# Patient Record
Sex: FEMALE | Race: WHITE
[De-identification: names, ages, dates, MRNs, and addresses within clinical notes are randomized per-mention and may not be internally consistent; named-entity substitution may affect disease eponyms.]

---

## 2019-04-03 ENCOUNTER — HOSPITAL ENCOUNTER (OUTPATIENT)
Dept: HOSPITAL 97 - OR | Age: 59
Discharge: HOME | End: 2019-04-03
Attending: SPECIALIST
Payer: COMMERCIAL

## 2019-04-03 DIAGNOSIS — N65.1: Primary | ICD-10-CM

## 2019-04-03 PROCEDURE — 88305 TISSUE EXAM BY PATHOLOGIST: CPT

## 2019-04-03 PROCEDURE — 0HPU0JZ REMOVAL OF SYNTHETIC SUBSTITUTE FROM LEFT BREAST, OPEN APPROACH: ICD-10-PCS

## 2019-04-03 PROCEDURE — 0HSV0ZZ REPOSITION BILATERAL BREAST, OPEN APPROACH: ICD-10-PCS

## 2019-04-03 PROCEDURE — 0HPT0JZ REMOVAL OF SYNTHETIC SUBSTITUTE FROM RIGHT BREAST, OPEN APPROACH: ICD-10-PCS

## 2019-04-03 RX ADMIN — HYDROMORPHONE HYDROCHLORIDE ONE MG: 1 INJECTION, SOLUTION INTRAMUSCULAR; INTRAVENOUS; SUBCUTANEOUS at 15:01

## 2019-04-03 RX ADMIN — HYDROMORPHONE HYDROCHLORIDE ONE MG: 1 INJECTION, SOLUTION INTRAMUSCULAR; INTRAVENOUS; SUBCUTANEOUS at 15:17

## 2019-04-03 NOTE — XMS REPORT
Continuity of Care Document

 Created on:2019



Patient:ALYSSA MEJIA

Sex:Female

:1960

External Reference #:[id number]





Demographics







 Address  [address] [place] [address]

 

 Phone  [phone]

 

 Phone  [phone]

 

 Preferred Language  Unknown

 

 Marital Status  Unknown

 

 Jewish Affiliation  Unknown

 

 Race  Unknown

 

 Ethnic Group  Unknown









Author







 Organization  [facility]









Problems







 Problem  Status  Onset  Classification  Date  Comments  Source



     Date    Reported    



             



             



             

 

 ROUTINE GYN  Active    Diagnosis  10/08/2013    [facility]



 EXAMINATION            [facility]



             



             

 

 Encounter for  Active    Diagnosis  2019    [facility]



 gynecological            [facility]



 examination            



 (routine) without            



 abnormal findings            



             



             

 

 Lump of breast  Active    Diagnosis  2019    [facility]



             [facility]



             



             

 

 Encounter for  Active    Diagnosis  2019    [facility]



 screening            [facility]



 mammogram for            



 breast cancer            



             



             







Medications







 Medication  Details  Route  Status  Patient  Ordering  Order  Source



         Instructions  Provider  Date  



               



               



               

 

 multivitamin  1 cap(s)  orally  Active  Multiple  [facility]    [facility]



         Vitamins orally      [facility]



         once a day      



               



               

 

 multivitamin  1 cap(s)  orally  Active  Multiple  [facility]    [facility]



         Vitamins orally      [facility]



         once a day      



               



               

 

 Vitamin B12  not  NA  Active    [facility]    [facility]



   defined            [facility]



               



               

 

 Vitamin C  not  NA  Active    [facility]    [facility]



   defined            [facility]



               



               

 

 Vitamin D  not  NA  Active    F[facility]iel    [facility]



   defined            [facility]



               



               







Allergies, Adverse Reactions, Alerts







 Substance  Category  Reaction  Severity  Reaction  Status  Date  Comments  
Source



         type    Reported    



                 



                 



                 

 

 N.K.D.A.  Adverse  Info Not    Adverse  Active      [facility]



   Reaction  Available    Reaction    9    [facility]



                 



                 



                 







Immunizations







 Immunization  Date Given  Site  Status  Last Updated  Comments  Source



             



             







Results







 Order  Results  Value  Reference  Date  Interpretation  Comments  Source



 Name      Range        



               



               







Vital Signs







 Vital Sign  Value  Date  Comments  Source



         

 

 Weight  114  2019    [facility]



         



         

 

 Height  63  2019    [facility]



         



         

 

 Diastolic (mm Hg)  80  2019    [facility]



         



         

 

 Systolic (mm Hg)  118  2019    [facility]



         



         

 

 Weight  113.8  2017    [facility]



         



         

 

 Height  63  2017    [facility]



         



         

 

 Diastolic (mm Hg)  78  2017    [facility]



         



         

 

 Systolic (mm Hg)  105  2017    [facility]



         



         

 

 Weight  110  2013    [facility]



         



         







Encounters







 Location  Location  Encounter  Encounter  Reason  Attending  ADM  DC  Status  
Source



   Details  Type  Number  For  Provider  Date  Date    



         Visit          



                   



                   



                   

 

 [facility]  [id number]          [facility]



 [facility]      [id number]      /2013    [facility]



       [id number]            



       b02            



                   



                   

 

 [facility]  [id number]          [facility]



 [facility]      -9c7      /2013    [facility]



       [id number]            



       db3            



                   



                   

 

 [facility]  [id number]          [facility]



 [facility]      [id number]      /2013    OB/GY[facility]



       [id number]            



       980            



                   



                   

 

 [facility]  [id number]          [facility]



 [facility]      [id number]      /2017    OB/GY[facility]



       [id number]            



       55e            



                   



                   

 

 [facility]  [id number]          [facility]



 [facility]      [id number]      /2017    OB/GY[facility]



       [id number]            



       a84            



                   



                   

 

 [facility]    mammogram  [id number]          [facility]



 [facility]    reminder  [id number]      /2017    OB/GYN



       [id number]            



       17a            



                   



                   







Procedures







 Procedure  Code  Date  Perfomer  Comments  Source

## 2019-04-03 NOTE — XMS REPORT
[facility]

 Created on:2013



Patient:Alondra Villafuerte

Sex:Female

:1960

External Reference #:[id number]





Demographics







 Address  [address] [place] [address]

 

 Phone  [phone]

 

 Preferred Language  Unknown

 

 Marital Status  Unknown

 

 Jain Affiliation  Unknown

 

 Race  Unknown

 

 Ethnic Group  Unknown









Author







 Organization  [employer]









Care Team Providers







 Name  Role  Phone

 

 Rufinomarcus Mana  Provider Role  Unavailable









Encounters







 Encounter  Location  Date

 

 [facility]  [facility]







Problems







 Problem Type  Condition  ICD-9 Code  Onset Dates  Condition Status

 

 Assessment  ROUTINE GYN EXAMINATION  V72.31    Active







Medications







 Medication  Code System  Code  Instructions  Start Date  End Date  Status  
Dosage

 

 multivitamin  MULTUM  01074  Multiple Vitamins      Active  1 cap(s)



       orally once a day        







Social History







 Social History Element  Qualifiers  Date Reported

 

 Tobacco Use:  no.  Status Never Smoker  2013

 

 Alcohol:  no.  2013

 

 Travel outside [place]:  .  Sons live in [place]  2013







Vital Signs







 Date/Time:  2013

 

 Weight  110 lbs







Results







 

 

 URINALYSIS









 Ketone(- )  neg

 

 Sp. Gr.(- )  neg

 

 Blood(- )  neg

 

 pH(- )  neg

 

 Protein(- )  neg

 

 Glucose(- )  ng

 

 Color/Clarity(- )  clean

 

 Bili(- )  neg

 

 Leuk(- )  neg

 

 Nitrite(- )  neg

 

 Urobili(- )  neg









 Pap IG, HPV-hr rfx 16&18







Summary Purpose

[employer] Submission

## 2019-04-03 NOTE — XMS REPORT
[facility]

 Created on:2019



Patient:Alondra Parnell

Sex:Female

:1960

External Reference #:[id number]





Demographics







 Address  [address] [place] [address]

 

 Phone  [phone]

 

 Preferred Language  Unknown

 

 Marital Status  Unknown

 

 Faith Affiliation  Unknown

 

 Race  Unknown

 

 Ethnic Group  Unknown









Author







 Organization  [employer]









Care Team Providers







 Name  Role  Phone

 

 Mana Nunez  Provider Role  Unavailable









Allergies

No Known Allergies



Problems







 Problem Type  Condition  Code  Onset Dates  Condition Status

 

 Assessment  Lump of breast  N63.0    Active







Medications

No Known Medications



Results

No Known Results



Summary Purpose

[employer] Submission

## 2019-04-03 NOTE — XMS REPORT
[facility]

 Created on:2017



Patient:Alondra Parnell

Sex:Female

:1960

External Reference #:[id number]





Demographics







 Address  [address] [place] [address]

 

 Phone  [phone]

 

 Preferred Language  Unknown

 

 Marital Status  Unknown

 

 Holiness Affiliation  Unknown

 

 Race  Unknown

 

 Ethnic Group  Unknown









Author







 Organization  [employer]









Care Team Providers







 Name  Role  Phone

 

 Mana Nunez  Provider Role  Unavailable









Encounters







 Encounter  Location  Date

 

 [facility]  [facility]

 

 [facility]  2017

 

 mammogram reminder  [facility]  2017







Social History







 Social History Element  Qualifiers  Date Reported

 

 Tobacco Use:  no.  Status Never Smoker  2017

 

 Alcohol:  .  Wine, 3-4 drinks per week  2017

 

 Travel outside [place]:  .  Sons live in [place]  2017







Summary Purpose

[employer] Submission

## 2019-04-03 NOTE — XMS REPORT
[facility]

 Created on:2019



Patient:Alondra Parnell

Sex:Female

:1960

External Reference #:[id number]





Demographics







 Address  [address] [place] [address]

 

 Phone  [phone]

 

 Preferred Language  Unknown

 

 Marital Status  Unknown

 

 Congregational Affiliation  Unknown

 

 Race  Unknown

 

 Ethnic Group  Unknown









Author







 Organization  [employer]









Care Team Providers







 Name  Role  Phone

 

 Mana Nunez  Provider Role  Unavailable









Allergies, Adverse Reactions, Alerts







 Substance  Reaction  Event Type

 

 N.K.D.A.  Info Not Available  Non Drug Allergy







Problems







 Problem Type  Condition  Code  Onset Dates  Condition Status

 

 Assessment  Encounter for gynecological  Z01.419    Active



   examination (general) (routine)      



   without abnormal findings      

 

 Assessment  Encounter for screening mammogram  Z12.31    Active



   for breast cancer      







Medications







 Medication  Code  Code  Instructions  Start  End Date  Status  Dosage



   System      Date      

 

 multivitamin  NDC  80484028240  Multiple      Active  1 cap(s)



       Vitamins orally        



       once a day        

 

 Vitamin B12  NDC  0        Active  not



               defined

 

 Vitamin C  NDC  0        Active  not



               defined

 

 Vitamin D  NDC  0        Active  not



               defined







Vital Signs







 Date/Time:  2019

 

 BMI  20.19 Index

 

 Weight  114 lbs

 

 Height  63 in

 

 Blood Pressure Diastolic  80 mm Hg

 

 Blood Pressure Systolic  118 mm Hg







Results

No Known Results



Summary Purpose

[employer] Submission

## 2019-04-03 NOTE — XMS REPORT
[facility]

 Created on:February 15, 2017



Patient:Alondra Parnell

Sex:Female

:1960

External Reference #:[id number]





Demographics







 Address  [address] [place] [address]

 

 Phone  [phone]

 

 Preferred Language  Unknown

 

 Marital Status  Unknown

 

 Latter day Affiliation  Unknown

 

 Race  Unknown

 

 Ethnic Group  Unknown









Author







 Organization  [employer]









Care Team Providers







 Name  Role  Phone

 

 RufinomarcusMana  Provider Role  Unavailable









Allergies, Adverse Reactions, Alerts







 Substance  Reaction  Event Type

 

 N.K.D.A.  Info Not Available  Non Drug Allergy







Encounters







 Encounter  Location  Date

 

 [facility]  [facility]

 

 [facility]  2017







Problems







 Problem Type  Condition  ICD-9 Code  Onset Dates  Condition Status

 

 Assessment  Encounter for gynecological  Z01.419    Active



   examination (general) (routine)      



   without abnormal findings      







Medications







 Medication  Code System  Code  Instructions  Start Date  End Date  Status  
Dosage

 

 multivitamin  MULTUM  74128  Multiple Vitamins      Active  1 cap(s)



       orally once a day        







Social History







 Social History Element  Qualifiers  Date Reported

 

 Tobacco Use:  no.  Status Never Smoker  2017

 

 Alcohol:  .  Wine, 3-4 drinks per week  2017

 

 Travel outside [place]:  .  Sons live in [place]  2017







Family history







 Qualifier  Description  Comment  Date Reported

 

 Maternal Grand Mother    Comment not available  2017

 

 Paternal Grand Mother    Comment not available  2017

 

 Siblings    Brother - heart attack,  2017

 

 Maternal Grand Father    Comment not available  2017

 

 Children    Comment not available  2017

 

 Father    old age, Arrythmia  2017

 

 Paternal Grand Father    Comment not available  2017

 

 Mother  alive  Comment not available  2017







Vital Signs







 Date/Time:  2017

 

 Weight  113.8 lbs

 

 Height  63 in

 

 Blood Pressure Diastolic  78 mm Hg

 

 Blood Pressure Systolic  105 mm Hg







Results







 

 

 URINALYSIS







Summary Purpose

[employer] Submission

## 2019-04-03 NOTE — XMS REPORT
[facility]

 Created on:2019



Patient:Alondra Parnell

Sex:Female

:1960

External Reference #:[id number]





Demographics







 Address  [address] [place] [address]

 

 Phone  [phone]

 

 Preferred Language  Unknown

 

 Marital Status  Unknown

 

 Oriental orthodox Affiliation  Unknown

 

 Race  Unknown

 

 Ethnic Group  Unknown









Author







 Organization  [employer]









Care Team Providers







 Name  Role  Phone

 

 Mana Nunez  Provider Role  Unavailable









Allergies

No Known Allergies



Problems

No Known Problems



Medications

No Known Medications



Results

No Known Results



Summary Purpose

[employer] Submission

## 2019-04-04 NOTE — OP
Date of Procedure:  04/03/2019



Surgeon:  Kennedy Christianson MD



Assistant:  Alfa.



Preoperative Diagnosis:  Asymmetry, status post breast augmentation.



Postoperative Diagnosis:  Asymmetry, status post breast augmentation.



Procedure Performed:  Explant and lift.



Anesthesia:  General.



Procedure In Detail:  After satisfactory induction of general anesthesia, chest was prepped with Dura
Prep.  Dry sterile drapes were applied in the usual manner.  A 42 template was used to outline both a
reola and transverse and curvilinear incisions were made.  The intervening skin was deepithelized wit
h dermabrader or EpiCut.  These flaps were elevated 1 cm thick and elevated towards the clavicle, erika
rnum, anterior axillary line.  This was done on both sides.  Then, the inferior incision was made.  T
he intervening skin was made into cone.  Prior to that, electrocautery was used to dissect at 12 o'cl
ock position and the implants removed were textured silicone 230.  The conization was performed with 
2-0 PDS suture and the straps were elevated at 12 o'clock, 1:30, and 3 o'clock positions on the right
 breast, mirror image to the left.  Straps were woven in and out of the pectoralis major muscle, back
 to the base of the cone, back to pectoralis major muscle, back to the base of the cone, tied to them
selves with 2-0 PDS.  The 3 o'clock strap was sewn over the sternum at 3 o'clock position with 2-0 Et
hibond.  Mirror image was done on the left side.  The wound then had a 10 WILTON drains brought out, sewn
 in place with 2-0 silk and then excess skin cut off.  Wounds were closed with 3-0 Vicryl, subcu with
 3-0 PDS running subcuticular, tied in the vertical meridian of the breast.  The patient was sat up. 
 Site for new nipple-areolar complex was marked out.  Tissue was cored out.  Nipple was delivered, ann
tured with interrupted 4-0 PDS followed by 4-0 PDS running subcuticular.  Dressings consisted of tinc
ture of benzoin, Steri-Strips, 5x5s, fluffs, and Ace wrap.  The patient tolerated the 

procedure well.  Amount of tissue removed from the right side was 42 g, left side 34 g Implants remov
ed 230 g each.





GH/MODL

DD:  04/03/2019 14:40:51Voice ID:  [id number]

DT:  04/04/2019 01:36:44Report ID:  [id number]